# Patient Record
(demographics unavailable — no encounter records)

---

## 2025-07-30 NOTE — PHYSICAL EXAM
[de-identified] : Constitutional: The patient appears well developed, well nourished. Examination of patients ability to communicate functionally was normal.       Neurologic: Coordination is normal. Alert and oriented to time, place and person. No evidence of mood disorder, calm affect.         RIGHT    KNEE: Inspection of the knee is as follows: mild effusion. no ecchymosis, no streaking, no erythema, no atrophy, no deformities of the quad tendon and no deformities of patellar tendon.       Palpation of the knee is as follows: medial joint line tenderness and PF TTP . no obvious defects, no palpable masses, no increased warmth and no crepitus.       Knee Range of Motion is as follows in degrees:       Extension: 0    Flexion: 125  with pain      Strength examination of the knee is as follows:       Quadriceps strength is 5/5   Hamstring strength is 5/5       Ligament Stability and Special Test:  positive McMurrays test. ligamentously stable, negative anterior draw, negative Lachman test, negative posterior draw and no varus or valgus instability. patella tracks well and able to do active straight leg raise without an extensor lag.       Neurological examination of the knee is as follows: light touch is intact throughout.       Gait and function is as follows: non-antalgic gait.   Imaging was reviewed in office today, 07/30/2025. These images were reviewed and findings are as follows.   ap/lat from [facility] show moderate medial joint space narrowing, PF spurring and signs of osgood schlatter (tibial tubercle ossicel noted)

## 2025-07-30 NOTE — HISTORY OF PRESENT ILLNESS
[de-identified] : 07/30/2025  NATE EM 53 year y/o F presents today for right knee pain. Patient is ambulation with a cane. Patient reports pain in posterior aspect of knee.  She notes pain has improved since a week ago.  She is careful with the knee.    Date of Onset: 3 weeks ago  Mechanism of injury: Patient fell down stairs approx 1 week ago.  Prior to that she did work at home and noted posterior knee pain.    Pain:    At Rest: 1/10    With Activity: 4/10   Have you been treated for this in the past? Urgent Care  OTC/Rx Medication:Tylenol QD with some relief.  Heat, Ice, Elevation: Previous Imaging/Studies: XR at  on 7/21/2025

## 2025-07-30 NOTE — DISCUSSION/SUMMARY
[de-identified] : NATE EM 53 year  F was seen and evaluated in office today. Following evaluation, the natural history of the pathology was explained in full to the patient in layman's terms. At this time, they explained to the patient that based on physical exam and imaging review, patient likely has Medial/lateral meniscal tear of the knee. Discussed further with patient that due to the fragile blood supply of the meniscus and the nature of the condition, it is unlikely that the tear will heal on its own, and they will likely experience constant/episodic pain in the knee limiting ADLs. Patient was advised that due to this they are a candidate for knee arthroscopy, partial meniscectomy to remove the damaged portions of the meniscus.  Furthermore, discussed with patient that due to this meniscal tear, they are at increased risk of developing knee osteoarthritis as the meniscus is a major component of the health of the knee joint. Discussed that due to the meniscal tear and the associated increased risk of progression to knee osteoarthritis, they ultimately may be indicated for total knee arthroplasty in the future.    Discussed with patient several different treatment options, along with specific risks and benefits of both surgical and non-surgical treatments. Nonsurgical options including but not limited to Corticosteroid Injection, Visco supplementation series, Meloxicam 15mg, Medrol Dose Pack, along with activity modification such as non-impact exercise. Risk of morbidity associated with proposed treatments were discussed. Risks include, but are not limited to, the risk associated with prescription strength medications and possible side effects of these medications which include GI hemorrhage with oral medications along with cardiac/renal issues with long term use   At this time, indicated patient for Meloxicam 15mg once daily as needed due to pain and inflammation. -Patient was provided prescription for Meloxicam 15mg to be taken once daily as needed. There is a moderate risk of morbidity from use of prescription strength medications. Possible side effects of these medications include upset stomach with oral medications and cardiac/renal issues with long term use. I recommended that the patient follow-up with their medical physician to discuss any significant specific potential issues with long term medication use such as interactions with current medications or with exacerbation of underlying medical comorbidities. The patient voiced their understanding of the risk.   Will follow up in 4-6 weeks, if pain persists may consider MRI